# Patient Record
(demographics unavailable — no encounter records)

---

## 2025-06-09 NOTE — OB HISTORY
[Total Preg ___] : : [unfilled] [Abortions ___] : [unfilled] (abortions) [Definite ___ (Date)] : the last menstrual period was [unfilled]

## 2025-06-12 NOTE — PHYSICAL EXAM
[Chaperoned Physical Exam] : A chaperone was present in the examining room during all aspects of the physical examination. [MA] : MA [Fully active, able to carry on all pre-disease performance without restriction] : Status 0 - Fully active, able to carry on all pre-disease performance without restriction [Absent] : CVAT: absent [Normal] : normal [Abnormal] : Adnexa(ae): Abnormal [Uterine Adnexae] : normal right adnexa [Adnexa Tenderness] : bilateral tenderness [FreeTextEntry2] :  Mary  [___] : there were no adnexal masses [de-identified] :  No lesion appreciated  [de-identified] : No discharge  [de-identified] :  no lesion. Smooth without nodule.  [de-identified] : Right adnexa mass palpable, slight tenderness, no guarding [de-identified] : Normal sphincter tone, smooth rectovaginal septum, no cul-de-sac nodules. No mass or prolapses.

## 2025-06-12 NOTE — PAST MEDICAL HISTORY
[Menstruating] : The patient is menstruating [Menarche Age ____] : age at menarche was [unfilled] [Definite ___ (Date)] : the last menstrual period was [unfilled] [Regular Cycle Intervals] : have been regular [Total Preg ___] : G[unfilled] [Abortions ___] : Abortions:[unfilled] [FreeTextEntry5] :  (TOP *1 2024 in [place])

## 2025-06-12 NOTE — LETTER BODY
[Attached please find my note.] : Attached please find my note. [FreeTextEntry2] :  aHrper Deluna [address] [place] [address] Tel [phone]  Dear Harper   Ms Yudy Read was seen in my office for a consultation regarding her ovarian cyst.  Please see my consult note for her plan of care.  Thank you for allowing me to participate in the care of this patient.    Please do not hesitate to call if you have any questions.    Most Sincerely,       Alex Stevenson MD [facility] Director, [facility] Professor of Obstetrics and Gynecology, [facility] [employer] at \[facility]\"" Division of Gynecologic Oncology Department Obstetrics and Gynecology Tel [phone] or [phone] Fax [phone] [email or web address]

## 2025-06-12 NOTE — LETTER BODY
[Attached please find my note.] : Attached please find my note. [FreeTextEntry2] :  Harper Deluna [address] [place] [address] Tel [phone]  Dear Harper   Ms Yudy Read was seen in my office for a consultation regarding her ovarian cyst.  Please see my consult note for her plan of care.  Thank you for allowing me to participate in the care of this patient.    Please do not hesitate to call if you have any questions.    Most Sincerely,       Alex Stevenson MD [facility] Director, [facility] Professor of Obstetrics and Gynecology, [facility] [employer] at [facility] Division of Gynecologic Oncology Department Obstetrics and Gynecology Tel [phone] or [phone] Fax [phone] [email or web address]

## 2025-06-12 NOTE — PAST MEDICAL HISTORY
[Menarche Age ____] : age at menarche was [unfilled] [Menstruating] : The patient is menstruating [Definite ___ (Date)] : the last menstrual period was [unfilled] [Total Preg ___] : G[unfilled] [Regular Cycle Intervals] : have been regular [Abortions ___] : Abortions:[unfilled] [FreeTextEntry5] :  (TOP *1 2024 in [place])

## 2025-06-12 NOTE — HISTORY OF PRESENT ILLNESS
[FreeTextEntry1] :   GYN/Ref Harper Deluna MD PCP Erin Patino  Ms. Read, 24 years old, , LMP 06/05/2025, referred for right adnexa cyst. The patient had a routine gynecological checkup with Dr. Deluna. A pelvic ultrasound performed on 05/23 revealed a 7.9 cm homogeneously hypoechoic mass in the right adnexa, obscuring the normal right ovarian tissue. Tumor markers were negative.  Patient denied fever, chills, nausea, vomiting, diarrhea, or vaginal bleeding.  Denied any changes in bowel or bladder habits. Denied changes in appetite or unintentional weight loss or gain. She reported right side pelvic pressure.  IMAGINING Pelvis US (05/23/2025-MSR) Uterus: 10.3 cm x 6.5 cm x 4.8 cm Uterus volume: 167.9 cc Endometrial thickness 8 mm Right ovary measures: x x Left ovary measures: 3.6 cm x 2.7 cm x 2.1 cm Description: Uterus: Retroverted uterus is noted. No uterine fibroids are detected Ovaries: Simple 24 mm left ovarian cyst is likely physiologic. A right adnexal homogeneously hypochoic 79 mm mass is identified obscuring normal right ovarian tissue Pelvic fluid: None IMPRESSION: LARGE RIGHT ADNEXAL HOMOGENEOUS MASS. WHILE POSSIBLY PEDUNCULATED FIBROID, OTHER CONSIDERATIONS SUCH AS ENDOMETRIOSIS CANNOT BE EXCLUDED. RECOMMEND CORRELATION WITH CONTRAST-ENHANCED MR EXAMINATION OF THE PELVIS  TUMOR MARKER (location/date) AFP= 1.9 CA19= 30.6 =46.8 Inhibin A=46 Inhibin B=87   HEALTHCARE MAINTENANCE PAP NILM (05/28/2025-)

## 2025-06-12 NOTE — ASSESSMENT
[FreeTextEntry1] : Pelvic Ultrasound (05/23/2025): A 79 mm homogeneously hypoechoic right adnexal mass was identified, obscuring normal right ovarian tissue. Imaging findings are consistent with a homogeneous cyst, which is typically benign.  I explained to the patient that homogeneous cysts are usually benign, and malignancy is highly unlikely based on current imaging and clinical impression. Right adnexa mass mobile, palpable and with mild tenderness during today's exam  Treatment options discussed with patient. Continue observation: Continue oral contraceptive pills (OCPs) and hope this the cyst can resolved itself over the time. Repeat pelvic ultrasound in 4-6 months after OCP started. Furthermore observation without OCP may also see resolution of the cyst, in my experience  Surgical Intervention: Option of laparoscopic ovarian cystectomy, a minimally invasive procedure that preserves ovarian tissue.  We plan to order a pelvic MRI to further evaluate the cyst and get more information on the ovarian cysts. The patient expressed understanding and agreed to proceed with MRI imaging as the next step.  She was advised to call with any concerns between visits. All questions were addressed during the visit. A business card was provided for future contact. Will reconvene after the study is done

## 2025-06-12 NOTE — REVIEW OF SYSTEMS
[Negative] : Musculoskeletal [Vaginal Discharge] : no vaginal discharge [Abn Vag Bleeding] : no abnormal vaginal bleeding [FreeTextEntry4] : She reported right side pelvic pressure.

## 2025-06-12 NOTE — LETTER BODY
[Attached please find my note.] : Attached please find my note. [FreeTextEntry2] :  Harper Deluna [address] [place] [address] Tel [phone]  Dear Harper   Ms Yudy Read was seen in my office for a consultation regarding her ovarian cyst.  Please see my consult note for her plan of care.  Thank you for allowing me to participate in the care of this patient.    Please do not hesitate to call if you have any questions.    Most Sincerely,       Alex Stevenson MD [facility] Director, [facility] Professor of Obstetrics and Gynecology, [facility] [employer] at \[facility]\"" Division of Gynecologic Oncology Department Obstetrics and Gynecology Tel [phone] or [phone] Fax [phone] [email or web address]

## 2025-06-12 NOTE — HISTORY OF PRESENT ILLNESS
[FreeTextEntry1] :   GYN/Ref Harper Deluna MD PCP Erin Patino  Ms. Read, 24 years old, , LMP 06/05/2025, referred for right adnexa cyst. The patient had a routine gynecological checkup with Dr. Deluna. A pelvic ultrasound performed on 05/23 revealed a 7.9 cm homogeneously hypoechoic mass in the right adnexa, obscuring the normal right ovarian tissue. Tumor markers were negative.  Patient denied fever, chills, nausea, vomiting, diarrhea, or vaginal bleeding.  Denied any changes in bowel or bladder habits. Denied changes in appetite or unintentional weight loss or gain. She reported right side pelvic pressure.  IMAGINING Pelvis US (05/23/2025-MSR) Uterus: 10.3 cm x 6.5 cm x 4.8 cm Uterus volume: 167.9 cc Endometrial thickness 8 mm Right ovary measures: x x Left ovary measures: 3.6 cm x 2.7 cm x 2.1 cm Description: Uterus: Retroverted uterus is noted. No uterine fibroids are detected Ovaries: Simple 24 mm left ovarian cyst is likely physiologic. A right adnexal homogeneously hypochoic 79 mm mass is identified obscuring normal right ovarian tissue Pelvic fluid: None IMPRESSION: LARGE RIGHT ADNEXAL HOMOGENEOUS MASS. WHILE POSSIBLY PEDUNCULATED FIBROID, OTHER CONSIDERATIONS SUCH AS ENDOMETRIOSIS CANNOT BE EXCLUDED. RECOMMEND CORRELATION WITH CONTRAST-ENHANCED MR EXAMINATION OF THE PELVIS  TUMOR MARKER (location/date) AFP= 1.9 CA19= 30.6 =85.8 Inhibin A=46 Inhibin B=87   HEALTHCARE MAINTENANCE PAP NILM (05/28/2025-)

## 2025-06-12 NOTE — PAST MEDICAL HISTORY
[Menstruating] : The patient is menstruating [Menarche Age ____] : age at menarche was [unfilled] [Definite ___ (Date)] : the last menstrual period was [unfilled] [Total Preg ___] : G[unfilled] [Regular Cycle Intervals] : have been regular [Abortions ___] : Abortions:[unfilled] [FreeTextEntry5] :  (TOP *1 2024 in [place])

## 2025-06-12 NOTE — PHYSICAL EXAM
[Chaperoned Physical Exam] : A chaperone was present in the examining room during all aspects of the physical examination. [MA] : MA [Fully active, able to carry on all pre-disease performance without restriction] : Status 0 - Fully active, able to carry on all pre-disease performance without restriction [Absent] : CVAT: absent [Normal] : normal [Abnormal] : Adnexa(ae): Abnormal [Uterine Adnexae] : normal right adnexa [Adnexa Tenderness] : bilateral tenderness [FreeTextEntry2] :  Mary  [___] : there were no adnexal masses [de-identified] :  No lesion appreciated  [de-identified] : No discharge  [de-identified] :  no lesion. Smooth without nodule.  [de-identified] : Right adnexa mass palpable, slight tenderness, no guarding [de-identified] : Normal sphincter tone, smooth rectovaginal septum, no cul-de-sac nodules. No mass or prolapses.

## 2025-06-12 NOTE — PHYSICAL EXAM
[Chaperoned Physical Exam] : A chaperone was present in the examining room during all aspects of the physical examination. [MA] : MA [Fully active, able to carry on all pre-disease performance without restriction] : Status 0 - Fully active, able to carry on all pre-disease performance without restriction [Absent] : CVAT: absent [Normal] : normal [Abnormal] : Adnexa(ae): Abnormal [Uterine Adnexae] : normal right adnexa [Adnexa Tenderness] : bilateral tenderness [FreeTextEntry2] :  Mary  [___] : there were no adnexal masses [de-identified] :  No lesion appreciated  [de-identified] : No discharge  [de-identified] :  no lesion. Smooth without nodule.  [de-identified] : Right adnexa mass palpable, slight tenderness, no guarding [de-identified] : Normal sphincter tone, smooth rectovaginal septum, no cul-de-sac nodules. No mass or prolapses.

## 2025-06-12 NOTE — HISTORY OF PRESENT ILLNESS
[FreeTextEntry1] :   GYN/Ref Harper Deluna MD PCP Erin Patino  Ms. Read, 24 years old, , LMP 06/05/2025, referred for right adnexa cyst. The patient had a routine gynecological checkup with Dr. Deluna. A pelvic ultrasound performed on 05/23 revealed a 7.9 cm homogeneously hypoechoic mass in the right adnexa, obscuring the normal right ovarian tissue. Tumor markers were negative.  Patient denied fever, chills, nausea, vomiting, diarrhea, or vaginal bleeding.  Denied any changes in bowel or bladder habits. Denied changes in appetite or unintentional weight loss or gain. She reported right side pelvic pressure.  IMAGINING Pelvis US (05/23/2025-MSR) Uterus: 10.3 cm x 6.5 cm x 4.8 cm Uterus volume: 167.9 cc Endometrial thickness 8 mm Right ovary measures: x x Left ovary measures: 3.6 cm x 2.7 cm x 2.1 cm Description: Uterus: Retroverted uterus is noted. No uterine fibroids are detected Ovaries: Simple 24 mm left ovarian cyst is likely physiologic. A right adnexal homogeneously hypochoic 79 mm mass is identified obscuring normal right ovarian tissue Pelvic fluid: None IMPRESSION: LARGE RIGHT ADNEXAL HOMOGENEOUS MASS. WHILE POSSIBLY PEDUNCULATED FIBROID, OTHER CONSIDERATIONS SUCH AS ENDOMETRIOSIS CANNOT BE EXCLUDED. RECOMMEND CORRELATION WITH CONTRAST-ENHANCED MR EXAMINATION OF THE PELVIS  TUMOR MARKER (location/date) AFP= 1.9 CA19= 30.6 =35.8 Inhibin A=46 Inhibin B=87   HEALTHCARE MAINTENANCE PAP NILM (05/28/2025-)

## 2025-07-10 NOTE — LETTER BODY
[Attached please find my note.] : Attached please find my note. [FreeTextEntry2] :  Harper Deluna [address] [place] [address] Tel [phone]  Dear Harper   Ms Yudy Read was seen in my office for a follow up regarding the MRI result.  Please see my consult note for her plan of care.  Thank you for allowing me to participate in the care of this patient.    Please do not hesitate to call if you have any questions.    Most Sincerely,      Alex Stevenson MD [facility] Director, [facility] Professor of Obstetrics and Gynecology, [facility] at [facility] Division of Gynecologic Oncology Department Obstetrics and Gynecology Tel [phone] or [phone] Fax [phone] [email or web address]   [FreeTextEntry1] : MRI report

## 2025-07-10 NOTE — PAST MEDICAL HISTORY
[Menstruating] : The patient is menstruating [Menarche Age ____] : age at menarche was [unfilled] [Definite ___ (Date)] : the last menstrual period was [unfilled] [Regular Cycle Intervals] : have been regular [Total Preg ___] : G[unfilled] [Abortions ___] : Abortions:[unfilled] [FreeTextEntry5] :  (TOP *1 2024 in [place])

## 2025-07-10 NOTE — HISTORY OF PRESENT ILLNESS
[FreeTextEntry1] :   The patient is here today to review her MRI results. She was last seen for evaluation of a right adnexal cyst. At the previous visit, we discussed the following treatment options: Continued Observation: Repeat pelvic ultrasound in 4-6 months after initiating oral contraceptive pills (OCPs). Surgical Intervention: Laparoscopic ovarian cystectomy, a minimally invasive procedure aimed at preserving ovarian tissue.  MRI (07/02/2025-MSR) Right ovarian 6.6cm dermoid cyst. No suspicious internal enhancement.  Patient felt fine.  She had no complaints.  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  === VISIT HISTORY 06/12/2025 Ms. Read, 24 years old, , LMP 06/05/2025, referred for right adnexa cyst. The patient had a routine gynecological checkup with Dr. Deluna. A pelvic ultrasound performed on 05/23 revealed a 7.9 cm homogeneously hypoechoic mass in the right adnexa, obscuring the normal right ovarian tissue. Tumor markers were negative.  Patient denied fever, chills, nausea, vomiting, diarrhea, or vaginal bleeding.  Denied any changes in bowel or bladder habits. Denied changes in appetite or unintentional weight loss or gain. She reported right side pelvic pressure.  IMAGINING Pelvis US (05/23/2025-MSR) Ovaries: Simple 24 mm left ovarian cyst is likely physiologic. A right adnexal homogeneously hypochoic 79 mm mass is identified obscuring normal right ovarian tissue IMPRESSION: Large right adnexal homogeneous mass. while possibly pedunculated fibroid, other considerations such as endometriosis cannot be excluded.   TUMOR MARKER (location/date) AFP= 1.9 CA19= 30.6 =43.8 Inhibin A=46 Inhibin B=87   HEALTHCARE MAINTENANCE PAP NILM (05/28/2025-)

## 2025-07-10 NOTE — LETTER BODY
[Attached please find my note.] : Attached please find my note. [FreeTextEntry2] :  Harper Deluna [address] [place] [address] Tel [phone]  Dear Harper   Ms Yudy Read was seen in my office for a follow up regarding the MRI result.  Please see my consult note for her plan of care.  Thank you for allowing me to participate in the care of this patient.    Please do not hesitate to call if you have any questions.    Most Sincerely,      Alex Stevenson MD [facility] Director, [facility] Professor of Obstetrics and Gynecology, [facility] at [facility] Division of Gynecologic Oncology Department Obstetrics and Gynecology Tel [phone] or [phone] Fax [phone] [email or web address]   [FreeTextEntry1] : MRI report

## 2025-07-10 NOTE — HISTORY OF PRESENT ILLNESS
[FreeTextEntry1] :   The patient is here today to review her MRI results. She was last seen for evaluation of a right adnexal cyst. At the previous visit, we discussed the following treatment options: Continued Observation: Repeat pelvic ultrasound in 4-6 months after initiating oral contraceptive pills (OCPs). Surgical Intervention: Laparoscopic ovarian cystectomy, a minimally invasive procedure aimed at preserving ovarian tissue.  MRI (07/02/2025-MSR) Right ovarian 6.6cm dermoid cyst. No suspicious internal enhancement.  Patient felt fine.  She had no complaints.  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  === VISIT HISTORY 06/12/2025 Ms. Read, 24 years old, , LMP 06/05/2025, referred for right adnexa cyst. The patient had a routine gynecological checkup with Dr. Deluna. A pelvic ultrasound performed on 05/23 revealed a 7.9 cm homogeneously hypoechoic mass in the right adnexa, obscuring the normal right ovarian tissue. Tumor markers were negative.  Patient denied fever, chills, nausea, vomiting, diarrhea, or vaginal bleeding.  Denied any changes in bowel or bladder habits. Denied changes in appetite or unintentional weight loss or gain. She reported right side pelvic pressure.  IMAGINING Pelvis US (05/23/2025-MSR) Ovaries: Simple 24 mm left ovarian cyst is likely physiologic. A right adnexal homogeneously hypochoic 79 mm mass is identified obscuring normal right ovarian tissue IMPRESSION: Large right adnexal homogeneous mass. while possibly pedunculated fibroid, other considerations such as endometriosis cannot be excluded.   TUMOR MARKER (location/date) AFP= 1.9 CA19= 30.6 =18.8 Inhibin A=46 Inhibin B=87   HEALTHCARE MAINTENANCE PAP NILM (05/28/2025-)

## 2025-07-10 NOTE — PHYSICAL EXAM
[Chaperoned Physical Exam] : A chaperone was present in the examining room during all aspects of the physical examination. [Uterine Adnexae] : normal right adnexa [Adnexa Tenderness] : bilateral tenderness [Fully active, able to carry on all pre-disease performance without restriction] : Status 0 - Fully active, able to carry on all pre-disease performance without restriction [___] : there were no adnexal masses [Normal] : Masses/Tenderness: Non-tender, no masses

## 2025-07-10 NOTE — ASSESSMENT
[FreeTextEntry1] : The MRI of the pelvis was reviewed with the patient today.  MRI (07/02/2025-MSR) Right ovarian 6.6cm dermoid cyst. No suspicious internal enhancement.  I explained that dermoid cysts are typically benign germ cell tumors of the ovary. While rare, immature malignant teratomas can occur, they are generally seen in significantly younger patients.  Her tumor markers were negative.  Therefore it is unlikely that she has an immature cystic teratoma. These cysts are usually slow-growing and asymptomatic but may cause pelvic pain or discomfort, particularly if they enlarge or result in ovarian torsion. Another rare complication is the development of squamous cell carcinoma within a dermoid cyst, though this risk is minimal.  Also torsion is more common in the second trimester of pregnancy when the uterus raises out of the pelvis.  We discussed the option of observation vs surgery and the pros and cons of these options based on the above clinical information.  In case of surgery we discussed the laparoscopic ovarian cystectomy, a minimally invasive procedure that allows for cyst removal while preserving ovarian tissue. The patient expressed understanding and agreed to proceed with surgery.  We discussed post operative recovery time and instruction  Patient would like to have another encounter when her mother comes in town from [place].  She will be back 7/22 to discuss further  She was advised to call with any concerns between visits. All questions were addressed during today's consultation.  20min of face to face encounter

## 2025-07-10 NOTE — PAST MEDICAL HISTORY
[Menstruating] : The patient is menstruating [Menarche Age ____] : age at menarche was [unfilled] [Definite ___ (Date)] : the last menstrual period was [unfilled] [Regular Cycle Intervals] : have been regular [Total Preg ___] : G[unfilled] [Abortions ___] : Abortions:[unfilled] [FreeTextEntry5] :  (TOP *1 2024 in [place])

## 2025-07-18 NOTE — PHYSICAL EXAM
[Normal] : Assessment of Respiratory effort: No increased work of breathing or signs of respiratory distress

## 2025-07-22 NOTE — ASSESSMENT
[FreeTextEntry1] : Preferred Languages: English   Patient  is 24-year-old female, accompanied by her mother. The MRI of the pelvis (07/02) was reviewed with the patient today. Imaging revealed a 6.6 cm right ovarian dermoid cyst with no suspicious internal enhancement.  Patient's mother was not aware of these findings.   I explained that dermoid in general is a benign finding.  Her tumor markers were done which indicated no evidence of malignancy.  I discussed the options of treatment including surgery versus observation.  I spoke about the possibility of ovarian torsion along with the risk of malignancy in the future in the case of a dermoid.  The risk is small but it is not zero, where these dermoids transformed themselves to very often a squamous cell carcinoma of the ovary.  I discussed the risk of ovarian torsion during pregnancy especially in the second trimester when uterus right above the pelvic brim.  The dermoid usually does not interfere with pregnancy.  Surgical option would be a laparoscopic approach with removal of the dermoid cyst and preservation of the ovarian tissue as the main check today.  Patient and mother inquired about possibility of recurrent disease.  I explained that there is no guarantee but the risk of that is minimal.  The patient expressed understanding and agreed to proceed with surgery. We will set time for end of September.  We will have her return before the surgery to discuss the detail and to sign consent form.  She was advised to call with any concerns in the interim.   20 min of face to face encounter and discussion

## 2025-07-22 NOTE — PAST MEDICAL HISTORY
[Menstruating] : The patient is menstruating [Menarche Age ____] : age at menarche was [unfilled] [Definite ___ (Date)] : the last menstrual period was [unfilled] [Regular Cycle Intervals] : have been regular [Total Preg ___] : G[unfilled] [Abortions ___] : Abortions:[unfilled] [FreeTextEntry5] :  (TOP *1 2024 in [place])

## 2025-07-22 NOTE — HISTORY OF PRESENT ILLNESS
[FreeTextEntry1] : The patient presented today to discuss surgical options in detail, accompanied by her mother.   patient felt fine.  She had no complaints.  normal menstruation. no abdominal pain. Mother came with patient this time to discuss the result and plan of management. We reviewed the clinical findings and discussed management options, including observation versus surgical intervention at last visit. -Office/Consent only -Preferred Languages: English  === VISIT HISTORY 07/10/2025 The patient is here today to review her MRI results. She was last seen for evaluation of a right adnexal cyst. At the previous visit, we discussed the following treatment options: Continued Observation: Repeat pelvic ultrasound in 4-6 months after initiating oral contraceptive pills (OCPs). Surgical Intervention: Laparoscopic ovarian cystectomy, a minimally invasive procedure aimed at preserving ovarian tissue. Patient felt fine.  She had no complaints.  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  06/12/2025 Ms. Read, 24 years old, , LMP 06/05/2025, referred for right adnexa cyst. The patient had a routine gynecological checkup with Dr. Deluna. A pelvic ultrasound performed on 05/23 revealed a 7.9 cm homogeneously hypoechoic mass in the right adnexa, obscuring the normal right ovarian tissue. Tumor markers were negative.  Patient denied fever, chills, nausea, vomiting, diarrhea, or vaginal bleeding.  Denied any changes in bowel or bladder habits. Denied changes in appetite or unintentional weight loss or gain. She reported right side pelvic pressure.  TUMOR MARKER AFP= 1.9 CA19= 30.6 =01.8 Inhibin A=46 Inhibin B=87  === IMAGINING Pelvis US (05/23/2025-MSR) Ovaries: Simple 24 mm left ovarian cyst is likely physiologic. A right adnexal homogeneously hypochoic 79 mm mass is identified obscuring normal right ovarian tissue IMPRESSION: Large right adnexal homogeneous mass. while possibly pedunculated fibroid, other considerations such as endometriosis cannot be excluded.   MRI (07/02/2025-MSR) Right ovarian 6.6cm dermoid cyst. No suspicious internal enhancement.  === HEALTHCARE MAINTENANCE PAP NILM (05/28/2025-)

## 2025-07-22 NOTE — LETTER BODY
[Attached please find my note.] : Attached please find my note. [FreeTextEntry2] :  Harper Deluna -[address] [place] [address] Tel [phone]  Dear Harper   Ms Yudy Read was seen in my office for a consultation regarding her dermoid cyst.  Please see my consult note for her plan of care.  Thank you for allowing me to participate in the care of this patient.    Please do not hesitate to call if you have any questions.    Most Sincerely,       Alex Stevenson MD [facility] Director, [facility] Professor of Obstetrics and Gynecology, [facility] [employer] at \[facility]\"" Division of Gynecologic Oncology Department Obstetrics and Gynecology Tel [phone] or [phone] Fax [phone] [email or web address]

## 2025-07-22 NOTE — REASON FOR VISIT
[FreeTextEntry1] : The patient is here today to review her MRI results with her mother [Parent] : parent

## 2025-07-22 NOTE — HISTORY OF PRESENT ILLNESS
[FreeTextEntry1] : The patient presented today to discuss surgical options in detail, accompanied by her mother.   patient felt fine.  She had no complaints.  normal menstruation. no abdominal pain. Mother came with patient this time to discuss the result and plan of management. We reviewed the clinical findings and discussed management options, including observation versus surgical intervention at last visit. -Office/Consent only -Preferred Languages: English  === VISIT HISTORY 07/10/2025 The patient is here today to review her MRI results. She was last seen for evaluation of a right adnexal cyst. At the previous visit, we discussed the following treatment options: Continued Observation: Repeat pelvic ultrasound in 4-6 months after initiating oral contraceptive pills (OCPs). Surgical Intervention: Laparoscopic ovarian cystectomy, a minimally invasive procedure aimed at preserving ovarian tissue. Patient felt fine.  She had no complaints.  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  06/12/2025 Ms. Read, 24 years old, , LMP 06/05/2025, referred for right adnexa cyst. The patient had a routine gynecological checkup with Dr. Deluna. A pelvic ultrasound performed on 05/23 revealed a 7.9 cm homogeneously hypoechoic mass in the right adnexa, obscuring the normal right ovarian tissue. Tumor markers were negative.  Patient denied fever, chills, nausea, vomiting, diarrhea, or vaginal bleeding.  Denied any changes in bowel or bladder habits. Denied changes in appetite or unintentional weight loss or gain. She reported right side pelvic pressure.  TUMOR MARKER AFP= 1.9 CA19= 30.6 =37.8 Inhibin A=46 Inhibin B=87  === IMAGINING Pelvis US (05/23/2025-[facility]) Ovaries: Simple 24 mm left ovarian cyst is likely physiologic. A right adnexal homogeneously hypochoic 79 mm mass is identified obscuring normal right ovarian tissue IMPRESSION: Large right adnexal homogeneous mass. while possibly pedunculated fibroid, other considerations such as endometriosis cannot be excluded.   MRI (07/02/2025-[facility]) Right ovarian 6.6cm dermoid cyst. No suspicious internal enhancement.  === HEALTHCARE MAINTENANCE PAP NILM (05/28/2025-)

## 2025-07-22 NOTE — HISTORY OF PRESENT ILLNESS
[FreeTextEntry1] : The patient presented today to discuss surgical options in detail, accompanied by her mother.   patient felt fine.  She had no complaints.  normal menstruation. no abdominal pain. Mother came with patient this time to discuss the result and plan of management. We reviewed the clinical findings and discussed management options, including observation versus surgical intervention at last visit. -Office/Consent only -Preferred Languages: English  === VISIT HISTORY 07/10/2025 The patient is here today to review her MRI results. She was last seen for evaluation of a right adnexal cyst. At the previous visit, we discussed the following treatment options: Continued Observation: Repeat pelvic ultrasound in 4-6 months after initiating oral contraceptive pills (OCPs). Surgical Intervention: Laparoscopic ovarian cystectomy, a minimally invasive procedure aimed at preserving ovarian tissue. Patient felt fine.  She had no complaints.  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  06/12/2025 Ms. Read, 24 years old, , LMP 06/05/2025, referred for right adnexa cyst. The patient had a routine gynecological checkup with Dr. Deluna. A pelvic ultrasound performed on 05/23 revealed a 7.9 cm homogeneously hypoechoic mass in the right adnexa, obscuring the normal right ovarian tissue. Tumor markers were negative.  Patient denied fever, chills, nausea, vomiting, diarrhea, or vaginal bleeding.  Denied any changes in bowel or bladder habits. Denied changes in appetite or unintentional weight loss or gain. She reported right side pelvic pressure.  TUMOR MARKER AFP= 1.9 CA19= 30.6 =85.8 Inhibin A=46 Inhibin B=87  === IMAGINING Pelvis US (05/23/2025-[facility]) Ovaries: Simple 24 mm left ovarian cyst is likely physiologic. A right adnexal homogeneously hypochoic 79 mm mass is identified obscuring normal right ovarian tissue IMPRESSION: Large right adnexal homogeneous mass. while possibly pedunculated fibroid, other considerations such as endometriosis cannot be excluded.   MRI (07/02/2025-[facility]) Right ovarian 6.6cm dermoid cyst. No suspicious internal enhancement.  === HEALTHCARE MAINTENANCE PAP NILM (05/28/2025-)

## 2025-07-22 NOTE — LETTER BODY
[Attached please find my note.] : Attached please find my note. [FreeTextEntry2] :  Harper Deluna -[address] [place] [address] Tel [phone]  Dear Harper   Ms Yudy Read was seen in my office for a consultation regarding her dermoid cyst.  Please see my consult note for her plan of care.  Thank you for allowing me to participate in the care of this patient.    Please do not hesitate to call if you have any questions.    Most Sincerely,       Alex Stevenson MD [facility] Director, [facility] Professor of Obstetrics and Gynecology, [facility] [employer] at \[facility]\"" Division of Gynecologic Oncology Department Obstetrics and Gynecology Tel [phone] or [phone] Fax [phone] [email or web address]

## 2025-07-22 NOTE — LETTER BODY
[Attached please find my note.] : Attached please find my note. [FreeTextEntry2] :  Harper Deluna -[address] [place] [address] Tel [phone]  Dear Harper   Ms Yudy Read was seen in my office for a consultation regarding her dermoid cyst.  Please see my consult note for her plan of care.  Thank you for allowing me to participate in the care of this patient.    Please do not hesitate to call if you have any questions.    Most Sincerely,       Alex Stevenson MD [facility] Director, [facility] Professor of Obstetrics and Gynecology, [facility] [employer] at [facility] Division of Gynecologic Oncology Department Obstetrics and Gynecology Tel [phone] or [phone] Fax [phone] [email or web address]

## 2025-07-22 NOTE — PAST MEDICAL HISTORY
[Menstruating] : The patient is menstruating [Menarche Age ____] : age at menarche was [unfilled] [Definite ___ (Date)] : the last menstrual period was [unfilled] [Regular Cycle Intervals] : have been regular [Total Preg ___] : G[unfilled] [Abortions ___] : Abortions:[unfilled] [FreeTextEntry5] :  (TOP *1 2024 in [place])

## 2025-07-22 NOTE — PAST MEDICAL HISTORY
[Menstruating] : The patient is menstruating [Menarche Age ____] : age at menarche was [unfilled] [Definite ___ (Date)] : the last menstrual period was [unfilled] [Regular Cycle Intervals] : have been regular [Total Preg ___] : G[unfilled] [Abortions ___] : Abortions:[unfilled] [FreeTextEntry5] :  (TOP *1 2024 in [place])